# Patient Record
Sex: FEMALE | Race: BLACK OR AFRICAN AMERICAN | NOT HISPANIC OR LATINO | URBAN - NONMETROPOLITAN AREA
[De-identification: names, ages, dates, MRNs, and addresses within clinical notes are randomized per-mention and may not be internally consistent; named-entity substitution may affect disease eponyms.]

---

## 2019-04-23 ENCOUNTER — IMPORTED ENCOUNTER (OUTPATIENT)
Dept: URBAN - NONMETROPOLITAN AREA CLINIC 1 | Facility: CLINIC | Age: 54
End: 2019-04-23

## 2019-04-23 PROBLEM — H40.013: Noted: 2019-04-23

## 2019-04-23 PROBLEM — H52.4: Noted: 2019-04-23

## 2019-04-23 PROCEDURE — 92004 COMPRE OPH EXAM NEW PT 1/>: CPT

## 2019-04-23 PROCEDURE — 92015 DETERMINE REFRACTIVE STATE: CPT

## 2019-04-23 NOTE — PATIENT DISCUSSION
Myopia / Joanne Britt / [place] Doing - Discussed diagnosis in detail with patient - New glasses Rx given today- Continue to monitorBorderline Glaucoma OU- Discussed diagnosis in detail with patient- Mother has Glaucoma - IOP today 15 OU- Cup to Disc noted at .6 OU - Continue to monitor- RTC 3-4 months f/U with [facility] OCT Gonio and PACHY; 's Notes: MR 4/23/19DFE 4/23/19

## 2021-01-21 NOTE — PATIENT DISCUSSION
Recommend Left lower lid ectropion repair with canthoplasty.  If patient elects BLL Bleph with perform Bilateral.  Will need right lower ectropion repair separate.

## 2021-01-21 NOTE — PATIENT DISCUSSION
Recommend Bilateral lower lid blepharoplasty trans conj laser (discussed risks and benefits of sx. ..) will canthoplasty.

## 2021-03-05 NOTE — PROCEDURE NOTE: CLINICAL
PROCEDURE NOTE: Punctal Plugs, Temporary #1 Bilateral Lower Lids. Anesthesia: Topical. Prior to treatment, the risks/benefits/alternatives were discussed. The patient wished to proceed with procedure. Temporary collagen plugs were inserted. Size/location of plugs inserted: 0.4mm. Patient tolerated procedure well. There were no complications. Post procedure instructions given. Maddy Monteiro

## 2021-03-05 NOTE — PATIENT DISCUSSION
3/5/2021 ZAID: Discussed the R/B/A of a conjunctivolplasty. Patient wishes to proceed. Will set up Surgery in about 1-2 months. (if patient notices a great improvement with dry eye therapy may call back and cancel surgery).

## 2021-05-25 NOTE — PATIENT DISCUSSION
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 After risks, benefits, and alternatives were discussed, the patient would like to proceed with punctual plugging of bilateral lower puncti. 0.4 mm dissolvable.

## 2021-09-28 NOTE — PATIENT DISCUSSION
Continue Maxitrol fatou qid for 1 week, tid for 1 week, bid for 1 week, then qd for 1 week per JOD OS.

## 2021-10-07 NOTE — PATIENT DISCUSSION
Will treat with: WC, steri-lid, AT's, and z-pack (x3), and Ointment Od QHS. May require cequa in the future.

## 2021-10-07 NOTE — PATIENT DISCUSSION
Steroid responder. Patient is on oral steroid and has elevated IOP OU today. Has been increased due to steroid treatment (oral prednisone) increase for Psoriatic Arthritis flare up. Decrease Maxitrol to qday for 4 days then D/C. Start [place] today.

## 2021-10-07 NOTE — PATIENT DISCUSSION
10/7/21 JOD: Significantly improved conjunctivocholasis. Patient reports improved symptoms of FBS. Healing well. 1 month post op with [facility].

## 2021-10-07 NOTE — PATIENT DISCUSSION
Steroid responder. Patient is on oral steroid and has elevated IOP OU today. Has been increased due to steroid treatment (oral prednisone) increase for Psoriatic Arthritis flare up. Decrease Maxitrol to qday for 4 days then D/C. Start [place] today. IOP check with [facility] in 1 week.

## 2021-11-11 NOTE — PATIENT DISCUSSION
10/7/21 JOD: Significantly improved conjunctivocholasis. Patient reports improved symptoms of FBS. Healing well. 1 month post op with [facility].

## 2021-12-10 NOTE — PATIENT DISCUSSION
10/7/21 JOD: Significantly improved conjunctivocholasis. Patient reports improved symptoms of FBS. Healing well. 1 month post op with [facility].

## 2022-02-09 NOTE — PATIENT DISCUSSION
Recommend Bilateral upper lid blepharoplasty. Cosmetic (discussed risks and benefits of sx. ..).   Will not raise brows.

## 2022-02-09 NOTE — PATIENT DISCUSSION
Recommend 2cc's of Juvederm Vollure (discussed risks and benefits. ..)  Will need several treatments.

## 2022-02-09 NOTE — PATIENT DISCUSSION
Recommend Mini lower lift, pre-tragel, SMAS to ML, prejowl(discussed risks and benefits of sx. .. ).

## 2022-04-09 ASSESSMENT — TONOMETRY
OS_IOP_MMHG: 15
OD_IOP_MMHG: 15

## 2022-04-09 ASSESSMENT — VISUAL ACUITY
OD_CC: 20/40-
OD_PH: 20/25
OS_CC: 20/50-

## 2022-07-15 ENCOUNTER — FOLLOW UP (OUTPATIENT)
Dept: URBAN - NONMETROPOLITAN AREA CLINIC 1 | Facility: CLINIC | Age: 57
End: 2022-07-15

## 2022-07-15 DIAGNOSIS — H40.013: ICD-10-CM

## 2022-07-15 PROCEDURE — 92083 EXTENDED VISUAL FIELD XM: CPT

## 2022-07-15 PROCEDURE — 92020 GONIOSCOPY: CPT

## 2022-07-15 PROCEDURE — 92133 CPTRZD OPH DX IMG PST SGM ON: CPT

## 2022-07-15 PROCEDURE — 76514 ECHO EXAM OF EYE THICKNESS: CPT

## 2022-07-15 PROCEDURE — 99214 OFFICE O/P EST MOD 30 MIN: CPT

## 2022-07-15 ASSESSMENT — TONOMETRY
OD_IOP_MMHG: 14
OS_IOP_MMHG: 14

## 2022-07-15 ASSESSMENT — VISUAL ACUITY
OD_CC: 20/20
OS_CC: 20/20-2

## 2022-07-15 ASSESSMENT — PACHYMETRY
OS_CT_UM: 600
OD_CT_UM: 614

## 2023-01-20 ENCOUNTER — ESTABLISHED PATIENT (OUTPATIENT)
Dept: URBAN - NONMETROPOLITAN AREA CLINIC 1 | Facility: CLINIC | Age: 58
End: 2023-01-20

## 2023-01-20 DIAGNOSIS — H52.4: ICD-10-CM

## 2023-01-20 PROCEDURE — 92015 DETERMINE REFRACTIVE STATE: CPT

## 2023-01-20 PROCEDURE — 92014 COMPRE OPH EXAM EST PT 1/>: CPT

## 2023-01-20 ASSESSMENT — TONOMETRY
OS_IOP_MMHG: 16
OD_IOP_MMHG: 15

## 2023-01-20 ASSESSMENT — VISUAL ACUITY
OD_CC: 20/25
OS_CC: 20/25

## 2023-12-01 ENCOUNTER — FOLLOW UP (OUTPATIENT)
Dept: URBAN - NONMETROPOLITAN AREA CLINIC 1 | Facility: CLINIC | Age: 58
End: 2023-12-01

## 2023-12-01 DIAGNOSIS — H40.013: ICD-10-CM

## 2023-12-01 PROCEDURE — 92133 CPTRZD OPH DX IMG PST SGM ON: CPT

## 2023-12-01 PROCEDURE — 99214 OFFICE O/P EST MOD 30 MIN: CPT

## 2023-12-01 ASSESSMENT — TONOMETRY
OD_IOP_MMHG: 16
OS_IOP_MMHG: 16

## 2023-12-01 ASSESSMENT — VISUAL ACUITY
OS_CC: 20/25-1
OD_CC: 20/22-2
OU_CC: 20/20

## 2024-06-04 ENCOUNTER — ESTABLISHED PATIENT (OUTPATIENT)
Dept: URBAN - NONMETROPOLITAN AREA CLINIC 1 | Facility: CLINIC | Age: 59
End: 2024-06-04

## 2024-06-04 DIAGNOSIS — H52.4: ICD-10-CM

## 2024-06-04 PROCEDURE — 92015 DETERMINE REFRACTIVE STATE: CPT

## 2024-06-04 PROCEDURE — 92014 COMPRE OPH EXAM EST PT 1/>: CPT

## 2024-06-04 ASSESSMENT — VISUAL ACUITY
OU_CC: 20/22-
OS_CC: 20/32-
OD_CC: 20/22-2

## 2024-06-04 ASSESSMENT — TONOMETRY
OD_IOP_MMHG: 14
OS_IOP_MMHG: 14

## 2024-12-05 ENCOUNTER — FOLLOW UP (OUTPATIENT)
Age: 59
End: 2024-12-05

## 2024-12-05 DIAGNOSIS — H40.013: ICD-10-CM

## 2024-12-05 DIAGNOSIS — H25.813: ICD-10-CM

## 2024-12-05 PROCEDURE — 99213 OFFICE O/P EST LOW 20 MIN: CPT

## 2024-12-05 PROCEDURE — 92133 CPTRZD OPH DX IMG PST SGM ON: CPT

## 2024-12-05 PROCEDURE — 92250 FUNDUS PHOTOGRAPHY W/I&R: CPT | Mod: NC

## 2025-08-21 ENCOUNTER — COMPREHENSIVE EXAM (OUTPATIENT)
Age: 60
End: 2025-08-21

## 2025-08-21 DIAGNOSIS — H52.4: ICD-10-CM

## 2025-08-21 PROCEDURE — 92015 DETERMINE REFRACTIVE STATE: CPT

## 2025-08-21 PROCEDURE — 92014 COMPRE OPH EXAM EST PT 1/>: CPT
